# Patient Record
Sex: FEMALE | Race: WHITE
[De-identification: names, ages, dates, MRNs, and addresses within clinical notes are randomized per-mention and may not be internally consistent; named-entity substitution may affect disease eponyms.]

---

## 2021-06-24 ENCOUNTER — HOSPITAL ENCOUNTER (OUTPATIENT)
Dept: HOSPITAL 53 - M RADPRO | Age: 31
End: 2021-06-24
Attending: GENERAL PRACTICE
Payer: COMMERCIAL

## 2021-06-24 DIAGNOSIS — N97.9: Primary | ICD-10-CM

## 2021-06-24 PROCEDURE — 74740 X-RAY FEMALE GENITAL TRACT: CPT

## 2021-06-24 PROCEDURE — 58340 CATHETER FOR HYSTEROGRAPHY: CPT

## 2021-06-24 NOTE — REP
INDICATION:

INFERTILITY.



COMPARISON:

None.



TECHNIQUE:

Referring clinician catheterized the cervix and injected contrast well I performed

fluoroscopy.



FINDINGS:

The uterine cavity opacifies well with contrast with no filling defect or contour

abnormality.  Both fallopian tubes opacify symmetrically and are normal in caliber.

There is bilateral intraperitoneal spillage of contrast.



IMPRESSION:

Bilateral fallopian tube patency.  0.8 minutes fluoroscopy time utilized.





<Electronically signed by Brayden Gerber > 06/24/21 [id number]

## 2021-06-24 NOTE — ROOPDOC
[facility] Report Of Operation


Report of Operation


REPRODUCTIVE ENDOCRINOLOGY


HSG PROCEDURE NOTE





PRE-PROCEDURE DIAGNOSIS:         1)  Infertility  





POST-PROCEDURE DIAGNOSIS:         Same





PHYSICIAN PERFORMING PROCEDURE:  Jamie C Humes





CONSENT:  The HSG procedure, indication, risks, and benefits were discussed with

the patient and


         informed written consent was obtained.





PATIENT COUNSELLED IN REGARDS TO HSG RISKS AND BENEFITS TO INCLUDE INFECTION, 

DISRUPTION OF PREGNANCY, BLEEDING, AND PAIN.





FINAL TIME OUT PERFORMED IMMEDIATELY PRIOR TO HSG.





PROCEDURE:   STERILE SPECULUM PLACED


      CERVIX CLEANSED WITH BETADINE TRIPLE SWAB


      TENACULUM UTILIZED (NO)


      HSG CATHETER PASSED, BALLOON INFLATED


      APPROX ____20__mL OF ISOVUE CONTRAST WAS INFUSED


      AP AND OBLIQUE IMAGES WERE OBTAINED





PRELIMINARY FINDINGS:


1)   UTERINE FINDINGS   NORMAL  


2)   LEFT FALLOPIAN TUBE   PATENT  


3)   RIGHT FALLOPIAN TUBE   PATENT  





PATIENT TOLERATED THE PROCEDURE WELL


DISCHARGED TO HOME WITH PRECAUTIONS





ANTIBIOTICS RECOMMENDED:   NO





COMPLICATIONS:   NONE





DISCHARGE INSTRUCTIONS GIVEN


PATIENT TO F/U WITH ORDERING PROVIDER FOR FINAL IMPRESSION AND CLINICAL C

ORRELATION





APPROX TIME:   10 MIN COUNSELLING


      20 MIN IN PROCEDURE











HUMES,JAMIE C. DO              Jun 24, 2021 13:51